# Patient Record
Sex: FEMALE | Race: WHITE | Employment: OTHER | URBAN - NONMETROPOLITAN AREA
[De-identification: names, ages, dates, MRNs, and addresses within clinical notes are randomized per-mention and may not be internally consistent; named-entity substitution may affect disease eponyms.]

---

## 2017-02-02 ENCOUNTER — HISTORY (OUTPATIENT)
Dept: FAMILY MEDICINE | Facility: OTHER | Age: 62
End: 2017-02-02

## 2017-02-02 ENCOUNTER — OFFICE VISIT - GICH (OUTPATIENT)
Dept: FAMILY MEDICINE | Facility: OTHER | Age: 62
End: 2017-02-02

## 2017-02-02 DIAGNOSIS — S50.12XA CONTUSION OF LEFT FOREARM: ICD-10-CM

## 2017-02-02 DIAGNOSIS — S16.1XXA STRAIN OF MUSCLE, FASCIA AND TENDON AT NECK LEVEL, INITIAL ENCOUNTER: ICD-10-CM

## 2017-02-02 ASSESSMENT — PATIENT HEALTH QUESTIONNAIRE - PHQ9: SUM OF ALL RESPONSES TO PHQ QUESTIONS 1-9: 4

## 2017-06-23 ENCOUNTER — AMBULATORY - GICH (OUTPATIENT)
Dept: SCHEDULING | Facility: OTHER | Age: 62
End: 2017-06-23

## 2017-06-23 DIAGNOSIS — Z12.31 ENCOUNTER FOR SCREENING MAMMOGRAM FOR MALIGNANT NEOPLASM OF BREAST: ICD-10-CM

## 2017-06-23 DIAGNOSIS — Z30.09 ENCOUNTER FOR OTHER GENERAL COUNSELING AND ADVICE ON CONTRACEPTION: ICD-10-CM

## 2017-07-05 ENCOUNTER — HISTORY (OUTPATIENT)
Dept: RADIOLOGY | Facility: OTHER | Age: 62
End: 2017-07-05

## 2017-07-05 ENCOUNTER — HOSPITAL ENCOUNTER (OUTPATIENT)
Dept: RADIOLOGY | Facility: OTHER | Age: 62
End: 2017-07-05

## 2017-07-05 ENCOUNTER — AMBULATORY - GICH (OUTPATIENT)
Dept: SCHEDULING | Facility: OTHER | Age: 62
End: 2017-07-05

## 2017-07-05 DIAGNOSIS — Z12.31 ENCOUNTER FOR SCREENING MAMMOGRAM FOR MALIGNANT NEOPLASM OF BREAST: ICD-10-CM

## 2017-07-06 ENCOUNTER — AMBULATORY - GICH (OUTPATIENT)
Dept: RADIOLOGY | Facility: OTHER | Age: 62
End: 2017-07-06

## 2017-07-06 DIAGNOSIS — R92.8 OTHER ABNORMAL AND INCONCLUSIVE FINDINGS ON DIAGNOSTIC IMAGING OF BREAST: ICD-10-CM

## 2017-07-07 ENCOUNTER — HOSPITAL ENCOUNTER (OUTPATIENT)
Dept: RADIOLOGY | Facility: OTHER | Age: 62
End: 2017-07-07

## 2017-07-07 DIAGNOSIS — R92.8 OTHER ABNORMAL AND INCONCLUSIVE FINDINGS ON DIAGNOSTIC IMAGING OF BREAST: ICD-10-CM

## 2017-12-27 NOTE — PROGRESS NOTES
Patient Information     Patient Name MRN Sex Ibeth Shrestha [id number] Female 1955      Progress Notes by Brittany Lopez at 2017  9:59 AM     Author:  Brittany Lopez Service:  (none) Author Type:  (none)     Filed:  2017  9:59 AM Date of Service:  2017  9:59 AM Status:  Signed     :  Brittany Lopez            Falls Risk Criteria:    Age 65 and older or under age 4        Sensory deficits    Poor vision    Use of ambulatory aides    Impaired judgment    Unable to walk independently    Meets High Risk criteria for falls:  no

## 2018-01-03 NOTE — PATIENT INSTRUCTIONS
Patient Information     Patient Name MRN Ibeth Aldana [id number] Female 1955      Patient Instructions by Fabi Navarro MD at 2017  3:43 PM     Author:  Fabi Navarro MD  Service:  (none) Author Type:  Physician     Filed:  2017  3:43 PM  Encounter Date:  2017 Status:  Addendum     :  Fabi Navarro MD (Physician)        Related Notes: Original Note by Fabi Navarro MD (Physician) filed at 2017  3:43 PM               Index Dutch Exercises   Neck Strain   ________________________________________________________________________  KEY POINTS    A neck strain is a stretch or tear of a muscle in your neck.    Strains may be treated with moist heat, ice, medicine, stretching and strengthening exercises, and sometimes physical therapy.    If you have a job or hobby that requires you to hold your neck in one position for long hours, like working at a computer all day, take breaks and stretch your neck muscles to help prevent neck strain.  ________________________________________________________________________  What is neck strain?  A neck strain is a stretch or tear of a muscle in your neck.  What is the cause?   Neck strains usually happen when the head and neck are hit or forcibly moved, such as during rough contact sports or a whiplash injury (like a car accident or a fall) where your head and neck are snapped back and forth. Sometimes strains happen from an awkward position during sleep or poor posture while you work at a desk.  What are the symptoms?  The main symptom is pain in your neck. You may have pain when you move your head to the side or when you try to move your head up or down. The neck muscles may tighten (spasm) and feel hard and very tender to the touch. You may feel pain right after an injury or not until a few hours or days after the injury.   Other symptoms may include:    Stiff neck    Dizziness    Unusual feelings, like burning or a  pins-and-needles feeling    Headache  How is it diagnosed?   Your healthcare provider will ask about your symptoms, medical history, and activities and examine your neck. You may have X-rays to make sure the bones in your neck (vertebrae) are not injured.  How is it treated?   Your healthcare provider may recommend stretching and strengthening exercises and other types of physical therapy to help you heal.   The pain often gets better within a few weeks with self-care, but some injuries may take longer to heal. It s important to follow all of your healthcare provider s instructions.  How can I help take care of myself?   To help relieve pain:    Take nonprescription pain medicine, such as acetaminophen, ibuprofen, or naproxen. Read the label and take as directed. Unless recommended by your healthcare provider, you should not take these medicines for more than 10 days.    Nonsteroidal anti-inflammatory medicines (NSAIDs), such as ibuprofen, naproxen, and aspirin, may cause stomach bleeding and other problems. These risks increase with age.    Acetaminophen may cause liver damage or other problems. Unless recommended by your provider, don't take more than 3000 milligrams (mg) in 24 hours. To make sure you don t take too much, check other medicines you take to see if they also contain acetaminophen. Ask your provider if you need to avoid drinking alcohol while taking this medicine.    Put an ice pack, gel pack, or package of frozen vegetables wrapped in a cloth on the injured area every 3 to 4 hours for up to 20 minutes at a time.    Put moist heat on the sore area for 10 to 15 minutes before you do warm-up and stretching exercises. Moist heat may help relax your muscles. Moist heat includes heat patches or moist heating pads that you can buy at most drugstores, a warm wet washcloth, or a hot shower. To prevent burns to your skin, follow directions on the package and do not lie on any type of hot pad. Don t use heat if  you have swelling.  You may find that it helps to alternate putting heat and ice on your neck.  Follow your healthcare provider's instructions, including any exercises recommended by your provider. Ask your provider:    How and when you will get your test results    How long it will take to recover    If there are activities you should avoid and when you can return to your normal activities    How to take care of yourself at home    What symptoms or problems you should watch for and what to do if you have them  Make sure you know when you should come back for a checkup. Keep all appointments for provider visits or tests.  How can I help prevent neck strain?   Neck strain may be prevented by keeping your neck muscles strong and flexible. Ask your healthcare provider about stretching and exercises that may help. If you have a job or hobby that requires you to hold your neck in one position for long hours, like working at a computer all day, it s very important to take breaks and stretch your neck muscles.  Developed by [employer].  Adult Advisor 2016.2 published by [employer].  Last modified: 2016-03-23  Last reviewed: 2015-06-29  This content is reviewed periodically and is subject to change as new health information becomes available. The information is intended to inform and educate and is not a replacement for medical evaluation, advice, diagnosis or treatment by a healthcare professional.  References   Adult Advisor 2016.2 Index    Copyright   2016 [employer], a division of [employer] All rights reserved.

## 2018-01-03 NOTE — PROGRESS NOTES
"Patient Information     Patient Name MRN Sex Ibeth Shrestha [id number] Female 1955      Progress Notes by Fabi Navarro MD at 2017  3:15 PM     Author:  Fabi Navarro MD Service:  (none) Author Type:  Physician     Filed:  2017  3:55 PM Encounter Date:  2017 Status:  Signed     :  Fabi Navarro MD (Physician)              SUBJECTIVE:    Ibeth Ray is a 61 y.o. 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 She fell backward and did NOT hit her head on the ground but her head whipped back and feels \"like whiplash\" to neck, sore in left elbow, right upper iliac area of back. Has not missed any work. The neck is sore and causing her to have a dull headache and some mild associated nausea. She denies visual changes. She has had no vomiting. There was no loss of consciousness. She was able to get right back up after the incident. No missed days of work. She did report this to the principal at her school.      Primary care is at [facility] and has not been seen at our clinic.    HPI    Allergies     Allergen  Reactions     Sulfa (Sulfonamide Antibiotics) Hives   ,   Current Outpatient Prescriptions:      aspirin-acetaminophen-caffeine, 250-250-65 mg, (EXCEDRIN EXTRA STRENGTH) 250-250-65 mg tablet, Take 1 tablet by mouth every 6 hours if needed for Headache. Max acetaminophen dose: 4000mg in 24 hrs., Disp: , Rfl: 0     Biotin 2,500 mcg capsule, Take 1 capsule by mouth., Disp: , Rfl: 0     Calcium carbonate (OYSTERSHELL CALCIUM) 500 mg tablet, Take 2 tablets by mouth 2 times daily with meals., Disp: , Rfl: 0     calcium carbonate (TUMS) 200 mg calcium (500 mg) chewable tablet, Take 1 tablet by mouth 3 times daily with meals., Disp: , Rfl: 0     cholecalciferol (VITAMIN D) 1,000 unit tablet, Take 1 tablet by mouth once daily., Disp: , Rfl: 0     cholecalciferol (VITAMIN D-3) 2,000 unit " capsule, Take 1 capsule by mouth once daily., Disp: , Rfl: 0     clobetasol 0.05% TOPICAL (TEMOVATE) 0.05 % external solution, Apply  topically to affected area(s) 2 times daily., Disp: 1 Bottle, Rfl: 0     cyanocobalamin (VITAMIN B-12) 1,000 mcg tablet, Take 1 tablet by mouth once daily., Disp: , Rfl: 0     cyclobenzaprine (FLEXERIL) 10 mg tablet, Take 1 tablet by mouth 3 times daily., Disp: 25 tablet, Rfl: 0     magnesium 250 mg tab, Take 2 tablets by mouth once daily., Disp: , Rfl: 0     methylcellulose (CITRUCEL SUGAR FREE) oral powder, Take  by mouth once daily. Mix with 8 oz glass of water., Disp: , Rfl: 0     minoxidil (ROGAINE) 2 % topical solution, Apply  topically to affected area(s) 2 times daily., Disp: 120 mL, Rfl: 0     multivitamin (MVI) tablet, Take 1 tablet by mouth once daily., Disp: , Rfl: 0     venlafaxine (EFFEXOR XR) 150 mg Extended-Release capsule, TAKE 1 CAPSULE BY MOUTH ONE TIME A DAY SWALLOW CAPSULE WHOLE DO NOT CRUSH OR CHEW. CAPSULE MAY BE OP, Disp: , Rfl: 3  Medications have been reviewed by me and are current to the best of my knowledge and ability. and   Patient Active Problem List       Diagnosis  Date Noted     Lichen planopilaris  09/24/2012     Alopecia  08/09/2011     Overview:   IMO Update        History of malignant neoplasm of skin  08/09/2011     Overview:   IMO Update 10/11  Basal cell cancer twice: Mohs procedure to nose.         OSTEOPENIA  07/25/2011     2016        VAGINITIS, ATROPHIC  07/18/2011     NECK PAIN, RIGHT  06/30/2011     IRRITABLE BOWEL, PREDOMINANTLY CONSTIPATION  08/16/2010     ARTHRALGIA  06/27/2008     MYALGIA  06/16/2008     BUNION, LEFT FOOT  06/16/2008     DEPRESSION/ANXIETY  03/24/2003     DEPRESSION, MAJOR, RECURRENT, IN FULL REMISSION  05/03/2001     COMMON MIGRAINE  04/30/1999     ROSACEA  04/30/1999       REVIEW OF SYSTEMS:  Review of Systems   Constitutional: Negative.    HENT: Negative for ear discharge, ear pain, hearing loss, nosebleeds and  "tinnitus.    Neurological: Positive for headaches. Negative for dizziness, tingling, tremors, sensory change, speech change, focal weakness and seizures.       OBJECTIVE:  /82  Pulse 80  Ht 1.68 m (5' 6.14\")  Wt 69.9 kg (154 lb)  BMI 24.75 kg/m2    EXAM:   Physical Exam   Constitutional: She is well-developed, well-nourished, and in no distress. No distress.   Mild limitation to rotation to the left noted as I enter the exam room and she looks towards the door.   HENT:   Head: Normocephalic.   Eyes: Pupils are equal, round, and reactive to light.   Neck: Neck supple. No tracheal deviation present.   Musculoskeletal: Normal range of motion.   No limitation to motion getting up onto the exam table she denies significant pain in the right iliac crest region where she indicates slight discomfort.  Range of motion at neck is limited with rotation to the left but for flexion is fairly good. There is no offset in the cervical spinous processes.  Denies weakness or dysesthesias in the upper extremities.  Tight muscles with knots noted in the left trapezius region.   Nursing note and vitals reviewed.      ASSESSMENT/PLAN:    ICD-10-CM    1. Neck strain, initial encounter S16.1XXA cyclobenzaprine (FLEXERIL) 10 mg tablet   2. Contusion of left forearm, initial encounter S50.12XA         Plan:    Flexeril to be used for muscle spasming and has very tight areas in the left trapezius musculature.  Ice, topical such as icy hot or salonpas patches.   She sees a chiropractor regularly and I encouraged her to get in sooner rather than later for adjustment and follow-up.  Follow-up as needed if this is not improving or she develops new symptoms not apparent today.  May return to work and she has not missed work as noted above.  Fabi Navarro MD  3:55 PM 2/2/2017           "

## 2018-01-26 VITALS
HEART RATE: 80 BPM | WEIGHT: 154 LBS | SYSTOLIC BLOOD PRESSURE: 120 MMHG | DIASTOLIC BLOOD PRESSURE: 82 MMHG | BODY MASS INDEX: 24.75 KG/M2 | HEIGHT: 66 IN

## 2018-02-02 ASSESSMENT — PATIENT HEALTH QUESTIONNAIRE - PHQ9: SUM OF ALL RESPONSES TO PHQ QUESTIONS 1-9: 4

## 2018-02-16 ENCOUNTER — DOCUMENTATION ONLY (OUTPATIENT)
Dept: FAMILY MEDICINE | Facility: OTHER | Age: 63
End: 2018-02-16

## 2018-02-16 RX ORDER — CYCLOBENZAPRINE HCL 10 MG
10 TABLET ORAL 3 TIMES DAILY
COMMUNITY
Start: 2017-02-02

## 2018-02-16 RX ORDER — ACETAMINOPHEN 160 MG
2000 TABLET,DISINTEGRATING ORAL DAILY
COMMUNITY
Start: 2017-02-02

## 2018-02-16 RX ORDER — CLOBETASOL PROPIONATE 0.5 MG/ML
1 SOLUTION TOPICAL 2 TIMES DAILY
COMMUNITY
Start: 2011-08-23

## 2018-02-16 RX ORDER — DIPHENOXYLATE HYDROCHLORIDE AND ATROPINE SULFATE 2.5; .025 MG/1; MG/1
1 TABLET ORAL DAILY
COMMUNITY
Start: 2011-08-23

## 2018-02-16 RX ORDER — CALCIUM CARBONATE 500 MG/1
500 TABLET, CHEWABLE ORAL
COMMUNITY
Start: 2017-02-02

## 2018-02-16 RX ORDER — VENLAFAXINE HYDROCHLORIDE 150 MG/1
1 CAPSULE, EXTENDED RELEASE ORAL SEE ADMIN INSTRUCTIONS
COMMUNITY
Start: 2017-01-19

## 2018-07-10 ENCOUNTER — HOSPITAL ENCOUNTER (OUTPATIENT)
Dept: MAMMOGRAPHY | Facility: OTHER | Age: 63
Discharge: HOME | End: 2018-07-10
Attending: NURSE PRACTITIONER | Admitting: NURSE PRACTITIONER
Payer: COMMERCIAL

## 2018-07-10 DIAGNOSIS — Z12.31 VISIT FOR SCREENING MAMMOGRAM: ICD-10-CM

## 2018-07-10 PROCEDURE — 77063 BREAST TOMOSYNTHESIS BI: CPT

## 2018-07-19 ENCOUNTER — HOSPITAL ENCOUNTER (OUTPATIENT)
Dept: ULTRASOUND IMAGING | Facility: OTHER | Age: 63
Discharge: HOME | End: 2018-07-19
Attending: NURSE PRACTITIONER | Admitting: NURSE PRACTITIONER
Payer: COMMERCIAL

## 2018-07-19 DIAGNOSIS — R92.8 ABNORMAL FINDING ON BREAST IMAGING: ICD-10-CM

## 2018-07-19 PROCEDURE — 76642 ULTRASOUND BREAST LIMITED: CPT | Mod: LT

## 2018-07-23 NOTE — PROGRESS NOTES
Patient Information     Patient Name  Ibeth Ray MRN  [id number] Sex  Female   1955      Letter by Janay Shafer MD at      Author:  Janay Shafer MD Service:  (none) Author Type:  (none)    Filed:   Date of Service:   Status:  (Other)       [facility]  [address]  [place] [address]  [phone]         Ibeth Ray   [address]  [facility] [address]      July 10, 2017  Date of Breast Imagin2017 11:00 AM    Dear Ms. Ray:    Your recent breast imaging examination showed a finding that requires additional imaging studies for a complete evaluation. Most findings are benign (not cancer). Please call [phone] to schedule an appointment for these tests if you have not already done so.    A report of your results was sent to your health care provider(s).    Your images will become part of your medical file here at [facility] and will be available for your continuing care. You are responsible for informing any new health care provider or breast imaging facility of the date and location of this examination.    Although mammography is the most accurate method for early detection, not all cancers are found through mammography. If you notice any new changes in your breast(s) please inform your health care provider without delay.    Thank you for choosing [facility] to participate in your healthcare needs.       [facility] Recommendations for Early Breast Cancer Detection   in Women without Symptoms  When to start having mammograms to screen for breast cancer, and how often to have them, is a personal decision. It should be based on your preferences, your values and your risk for developing breast cancer. [facility] recommends that you and your health care provider together determine when mammograms are right for you.    [facility] recommends the following guidelines for  women who have an average risk for breast cancer, based on American Cancer Society guidelines:    Age 40 to 44: Mammograms are optional.     Age 45 to 54: Have a mammogram every year.    Age 55 and older: Have a mammogram every year, or transition to having one every 2 years. Continue to have mammograms as long as your health is good.    If you have a higher than average risk for breast cancer, your health care provider may recommend a different schedule.

## 2018-07-24 NOTE — PROGRESS NOTES
Patient Information     Patient Name  Ibeth Ray MRN  [id number] Sex  Female   1955      Letter by Janay Shafer MD at      Author:  Janay Shafer MD Service:  (none) Author Type:  (none)    Filed:   Date of Service:   Status:  (Other)       [facility]  [address]  [place] [address]  [phone]         Ibeth Ray   [address]  [facility] [address]      2017  Date of Breast Imagin2017 10:16 AM    Dear Ms. Ray:    Your recent breast imaging examination showed a finding that requires additional imaging studies for a complete evaluation. Most findings are benign (not cancer). Please call [phone] to schedule an appointment for these tests if you have not already done so.    A report of your results was sent to your health care provider(s).  Your mammogram shows that your breast tissue is dense.  Dense breast tissue is relatively common and is found in more than 40 percent of women.  However, dense breast tissue may make it harder to identify cancer through a mammogram.  It may also be related to an increased risk of breast cancer.    The results of your mammogram are given to you and your primary care provider.  This information will raise your own awareness and help you talk with your primary care provider about your screening options.  Together you can decide which options are right for you based on your mammogram results, risk factors or physical exam.    Your images will become part of your medical file here at [facility] and will be available for your continuing care. You are responsible for informing any new health care provider or breast imaging facility of the date and location of this examination.    Although mammography is the most accurate method for early detection, not all cancers are found through mammography. If you notice any new changes in your breast(s) please inform your health care provider without delay.    Thank you  for choosing [facility] to participate in your healthcare needs.       [facility] Recommendations for Early Breast Cancer Detection   in Women without Symptoms  When to start having mammograms to screen for breast cancer, and how often to have them, is a personal decision. It should be based on your preferences, your values and your risk for developing breast cancer. [facility] recommends that you and your health care provider together determine when mammograms are right for you.    [facility] recommends the following guidelines for women who have an average risk for breast cancer, based on American Cancer Society guidelines:    Age 40 to 44: Mammograms are optional.     Age 45 to 54: Have a mammogram every year.    Age 55 and older: Have a mammogram every year, or transition to having one every 2 years. Continue to have mammograms as long as your health is good.    If you have a higher than average risk for breast cancer, your health care provider may recommend a different schedule.

## 2020-05-19 ENCOUNTER — ALLIED HEALTH/NURSE VISIT (OUTPATIENT)
Dept: FAMILY MEDICINE | Facility: OTHER | Age: 65
End: 2020-05-19
Attending: OPHTHALMOLOGY
Payer: COMMERCIAL

## 2020-05-19 DIAGNOSIS — Z01.818 PREOPERATIVE EXAMINATION: Primary | ICD-10-CM

## 2020-05-19 PROCEDURE — 87635 SARS-COV-2 COVID-19 AMP PRB: CPT | Mod: ZL

## 2020-05-19 PROCEDURE — 99207 ZZC NO CHARGE NURSE ONLY: CPT

## 2020-05-20 LAB
SARS-COV-2 RNA SPEC QL NAA+PROBE: NOT DETECTED
SPECIMEN SOURCE: NORMAL